# Patient Record
Sex: FEMALE
[De-identification: names, ages, dates, MRNs, and addresses within clinical notes are randomized per-mention and may not be internally consistent; named-entity substitution may affect disease eponyms.]

---

## 2019-03-30 ENCOUNTER — HOSPITAL ENCOUNTER (EMERGENCY)
Dept: HOSPITAL 14 - H.ER | Age: 19
LOS: 1 days | Discharge: HOME | End: 2019-03-31
Payer: COMMERCIAL
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
## 2019-03-30 DIAGNOSIS — S43.004A: Primary | ICD-10-CM

## 2019-03-30 PROCEDURE — 96374 THER/PROPH/DIAG INJ IV PUSH: CPT

## 2019-03-30 PROCEDURE — 99283 EMERGENCY DEPT VISIT LOW MDM: CPT

## 2019-03-30 PROCEDURE — 73030 X-RAY EXAM OF SHOULDER: CPT

## 2019-03-30 PROCEDURE — 23655 CLTX SHO DSLC W/MNPJ W/ANES: CPT

## 2019-03-31 VITALS — HEART RATE: 64 BPM | RESPIRATION RATE: 20 BRPM | SYSTOLIC BLOOD PRESSURE: 134 MMHG | DIASTOLIC BLOOD PRESSURE: 62 MMHG

## 2019-03-31 VITALS — TEMPERATURE: 98.3 F | OXYGEN SATURATION: 98 %

## 2019-03-31 NOTE — ED PDOC
Upper Extremity Pain/Injury


Time Seen by Provider: 03/31/19 00:33


Chief Complaint (Nursing): Upper Extremity Problem/Injury


Chief Complaint (Provider): shoulder dislocation


History Per: Patient


History/Exam Limitations: no limitations


Onset/Duration Of Symptoms: Hrs


Current Symptoms Are (Timing): Still Present


Additional Complaint(s): 





Michelle Tracey is a 18 year old female, with a past medical history of recurrent 

shoulder dislocations, who presents to the emergency department complaining of a

right shoulder dislocation. Patient admits drinking alcohol and states she was 

walking on high heels when she rolled her right ankle. Patient grabbed onto a 

pole to steady herself and dislocated her right shoulder. Patient states she 

cannot lower her arm without pain. She denies any numbness or other medical 

complaints.





PMD: None provided. 





Past Medical History


Reviewed: Historical Data, Nursing Documentation, Vital Signs


Vital Signs: 





                                Last Vital Signs











Temp  98.3 F   03/31/19 00:04


 


Pulse  92 H  03/31/19 00:04


 


Resp  17   03/31/19 00:04


 


BP  145/93 H  03/31/19 00:04


 


Pulse Ox  98   03/31/19 00:04














- Medical History


PMH: No Chronic Diseases





- Surgical History


Surgical History: No Surg Hx





- Family History


Family History: States: Unknown Family Hx





- Social History


Alcohol: Social





- Allergies


Allergies/Adverse Reactions: 


                                    Allergies











Allergy/AdvReac Type Severity Reaction Status Date / Time


 


No Known Allergies Allergy   Verified 03/31/19 00:06














Review of Systems


ROS Statement: Except As Marked, All Systems Reviewed And Found Negative


Musculoskeletal: Positive for: Shoulder Pain (right dislocation)





Physical Exam





- Reviewed


Nursing Documentation Reviewed: Yes


Vital Signs Reviewed: Yes





- Physical Exam


Appears: Positive for: No Acute Distress


Head Exam: Positive for: ATRAUMATIC, NORMAL INSPECTION, NORMOCEPHALIC


Skin: Positive for: Normal Color, Warm, Dry


Eye Exam: Positive for: Normal appearance, EOMI, PERRL


Neck: Positive for: Normal, Painless ROM, Supple


Extremity: Positive for: Normal ROM (normal ROM of right ankle), Deformity 

(Right shoulder has a stepoff-deformity with intact sensation over the deltoid. 

Neurovascularly intact distally).  Negative for: Tenderness (Right ankle), 

Swelling (Right ankle)


Neurological/Psych: Positive for: Awake, Alert, Normal Tone, Oriented





- ECG


O2 Sat by Pulse Oximetry: 98 (RA)


Pulse Ox Interpretation: Normal





Medical Decision Making


Medical Decision Making: 





Time: 00:33


A/P: 18 year old female with history of recurrent shoulder dislocations 

presenting with a right shoulder dislocation.





Initial Plan:





--Morphine 4 mg IVP


--Shoulder Left [RAD]


--Shoulder Right [RAD]


--Reevaluation








01:25


-Shoulder reduction with down retraction, external rotation with successful 

reduction after x1 attempt. Treated with morphine prior to reduction.








02:40


Upon provider evaluation patient is medically stable, and requires no further 

treatment in the ED at this time. Patient will be discharged home. Counseling 

was provided and all questions were answered regarding diagnosis and need for 

follow up with orthopedist. There is agreement to discharge plan.





 

--------------------------------------------------------------------------------


-----------------   


Scribe Attestation:


Documented by Dex El, acting as a scribe for Kennedy Tomlin MD





Provider Scribe Attestation:


All medical record entries made by the Scribe were at my direction and 

personally dictated by me. I have reviewed the chart and agree that the record 

accurately reflects my personal performance of the history, physical exam, 

medical decision making, and the department course for this patient. I have also

 personally directed, reviewed, and agree with the discharge instructions and 

disposition.





Procedures





- Joint Reduction


Joint Reduction Site: shoulder (R)


Conscious Sedation: No


Reduction Attempts: 1


Pre-Procedure NV Exam: Yes


Post Joint Reduction Film: joint reduced


Progress: 





Downward Traction/External Rotation





Disposition





- Clinical Impression


Clinical Impression: 


 Shoulder dislocation








- Disposition


Referrals: 


Bj Power III, MD [Staff Provider] - 


Disposition: Routine/Home


Disposition Time: 02:40


Condition: IMPROVED


Instructions:  Shoulder Dislocation


Forms:  [employer] (English)